# Patient Record
Sex: MALE | Race: WHITE
[De-identification: names, ages, dates, MRNs, and addresses within clinical notes are randomized per-mention and may not be internally consistent; named-entity substitution may affect disease eponyms.]

---

## 2018-01-19 ENCOUNTER — HOSPITAL ENCOUNTER (OUTPATIENT)
Dept: HOSPITAL 95 - ORSCSDS | Age: 63
Discharge: HOME | End: 2018-01-19
Payer: COMMERCIAL

## 2018-01-19 VITALS — BODY MASS INDEX: 34.27 KG/M2 | WEIGHT: 231.38 LBS | HEIGHT: 69.02 IN

## 2018-01-19 DIAGNOSIS — Z79.01: ICD-10-CM

## 2018-01-19 DIAGNOSIS — K22.10: Primary | ICD-10-CM

## 2018-01-19 DIAGNOSIS — E78.5: ICD-10-CM

## 2018-01-19 DIAGNOSIS — K44.9: ICD-10-CM

## 2018-01-19 DIAGNOSIS — I10: ICD-10-CM

## 2018-01-19 DIAGNOSIS — J44.9: ICD-10-CM

## 2018-01-19 DIAGNOSIS — Z79.84: ICD-10-CM

## 2018-01-19 DIAGNOSIS — Z87.891: ICD-10-CM

## 2018-01-19 DIAGNOSIS — Z99.81: ICD-10-CM

## 2018-01-19 DIAGNOSIS — E11.9: ICD-10-CM

## 2018-01-19 DIAGNOSIS — Z79.899: ICD-10-CM

## 2018-01-19 PROCEDURE — 0DJ08ZZ INSPECTION OF UPPER INTESTINAL TRACT, VIA NATURAL OR ARTIFICIAL OPENING ENDOSCOPIC: ICD-10-PCS | Performed by: INTERNAL MEDICINE

## 2018-03-23 ENCOUNTER — HOSPITAL ENCOUNTER (OUTPATIENT)
Dept: HOSPITAL 95 - ORSCSDS | Age: 63
Discharge: HOME | End: 2018-03-23
Payer: COMMERCIAL

## 2018-03-23 VITALS — BODY MASS INDEX: 34.21 KG/M2 | HEIGHT: 69.02 IN | WEIGHT: 231 LBS

## 2018-03-23 DIAGNOSIS — K31.7: ICD-10-CM

## 2018-03-23 DIAGNOSIS — Z79.4: ICD-10-CM

## 2018-03-23 DIAGNOSIS — Z79.899: ICD-10-CM

## 2018-03-23 DIAGNOSIS — E66.9: ICD-10-CM

## 2018-03-23 DIAGNOSIS — Z99.81: ICD-10-CM

## 2018-03-23 DIAGNOSIS — J44.9: ICD-10-CM

## 2018-03-23 DIAGNOSIS — E78.5: ICD-10-CM

## 2018-03-23 DIAGNOSIS — Z79.01: ICD-10-CM

## 2018-03-23 DIAGNOSIS — K22.10: Primary | ICD-10-CM

## 2018-03-23 DIAGNOSIS — E11.9: ICD-10-CM

## 2018-03-23 DIAGNOSIS — Z87.891: ICD-10-CM

## 2018-03-23 DIAGNOSIS — N18.3: ICD-10-CM

## 2018-03-23 DIAGNOSIS — I26.99: ICD-10-CM

## 2018-03-23 PROCEDURE — 0DB68ZX EXCISION OF STOMACH, VIA NATURAL OR ARTIFICIAL OPENING ENDOSCOPIC, DIAGNOSTIC: ICD-10-PCS | Performed by: INTERNAL MEDICINE

## 2022-12-04 ENCOUNTER — HOSPITAL ENCOUNTER (INPATIENT)
Dept: HOSPITAL 95 - ER | Age: 67
LOS: 2 days | Discharge: HOME | DRG: 193 | End: 2022-12-06
Attending: HOSPITALIST | Admitting: HOSPITALIST
Payer: MEDICARE

## 2022-12-04 VITALS — HEIGHT: 69 IN | WEIGHT: 257.5 LBS | BODY MASS INDEX: 38.14 KG/M2

## 2022-12-04 DIAGNOSIS — J96.01: ICD-10-CM

## 2022-12-04 DIAGNOSIS — Z85.820: ICD-10-CM

## 2022-12-04 DIAGNOSIS — K21.9: ICD-10-CM

## 2022-12-04 DIAGNOSIS — E78.5: ICD-10-CM

## 2022-12-04 DIAGNOSIS — J10.1: Primary | ICD-10-CM

## 2022-12-04 DIAGNOSIS — Z79.2: ICD-10-CM

## 2022-12-04 DIAGNOSIS — Z98.890: ICD-10-CM

## 2022-12-04 DIAGNOSIS — Z79.51: ICD-10-CM

## 2022-12-04 DIAGNOSIS — J44.1: ICD-10-CM

## 2022-12-04 DIAGNOSIS — N18.4: ICD-10-CM

## 2022-12-04 DIAGNOSIS — E11.42: ICD-10-CM

## 2022-12-04 DIAGNOSIS — Z79.01: ICD-10-CM

## 2022-12-04 DIAGNOSIS — E87.6: ICD-10-CM

## 2022-12-04 DIAGNOSIS — E11.22: ICD-10-CM

## 2022-12-04 DIAGNOSIS — M79.2: ICD-10-CM

## 2022-12-04 DIAGNOSIS — Z87.891: ICD-10-CM

## 2022-12-04 DIAGNOSIS — Z20.822: ICD-10-CM

## 2022-12-04 DIAGNOSIS — M62.82: ICD-10-CM

## 2022-12-04 DIAGNOSIS — I21.A1: ICD-10-CM

## 2022-12-04 DIAGNOSIS — Z79.4: ICD-10-CM

## 2022-12-04 DIAGNOSIS — Z91.041: ICD-10-CM

## 2022-12-04 DIAGNOSIS — I12.9: ICD-10-CM

## 2022-12-04 DIAGNOSIS — Z86.711: ICD-10-CM

## 2022-12-04 DIAGNOSIS — Z90.49: ICD-10-CM

## 2022-12-04 DIAGNOSIS — Z79.899: ICD-10-CM

## 2022-12-04 DIAGNOSIS — J45.901: ICD-10-CM

## 2022-12-04 DIAGNOSIS — E66.01: ICD-10-CM

## 2022-12-04 DIAGNOSIS — Z79.811: ICD-10-CM

## 2022-12-04 LAB
ALBUMIN SERPL BCP-MCNC: 3.3 G/DL (ref 3.4–5)
ALBUMIN SERPL BCP-MCNC: 3.7 G/DL (ref 3.4–5)
ALBUMIN/GLOB SERPL: 0.9 {RATIO} (ref 0.8–1.8)
ALBUMIN/GLOB SERPL: 1 {RATIO} (ref 0.8–1.8)
ALT SERPL W P-5'-P-CCNC: 19 U/L (ref 12–78)
ALT SERPL W P-5'-P-CCNC: 21 U/L (ref 12–78)
ANION GAP SERPL CALCULATED.4IONS-SCNC: 12 MMOL/L (ref 6–16)
ANION GAP SERPL CALCULATED.4IONS-SCNC: 9 MMOL/L (ref 6–16)
AST SERPL W P-5'-P-CCNC: 25 U/L (ref 12–37)
AST SERPL W P-5'-P-CCNC: 29 U/L (ref 12–37)
BASOPHILS # BLD AUTO: 0.04 K/MM3 (ref 0–0.23)
BASOPHILS # BLD AUTO: 0.06 K/MM3 (ref 0–0.23)
BASOPHILS NFR BLD AUTO: 1 % (ref 0–2)
BASOPHILS NFR BLD AUTO: 1 % (ref 0–2)
BILIRUB SERPL-MCNC: 0.3 MG/DL (ref 0.1–1)
BILIRUB SERPL-MCNC: 0.5 MG/DL (ref 0.1–1)
BUN SERPL-MCNC: 25 MG/DL (ref 8–24)
BUN SERPL-MCNC: 27 MG/DL (ref 8–24)
CALCIUM SERPL-MCNC: 8.5 MG/DL (ref 8.5–10.1)
CALCIUM SERPL-MCNC: 8.9 MG/DL (ref 8.5–10.1)
CHLORIDE SERPL-SCNC: 104 MMOL/L (ref 98–108)
CHLORIDE SERPL-SCNC: 105 MMOL/L (ref 98–108)
CHOLEST SERPL-MCNC: 169 MG/DL (ref 50–200)
CHOLEST/HDLC SERPL: 3.3 {RATIO}
CK MB CFR SERPL CALC: 0.4 % (ref 0–4)
CK MB CFR SERPL CALC: 0.9 % (ref 0–4)
CK SERPL-CCNC: 2350 U/L (ref 39–308)
CK SERPL-CCNC: 339 U/L (ref 39–308)
CO2 SERPL-SCNC: 20 MMOL/L (ref 21–32)
CO2 SERPL-SCNC: 24 MMOL/L (ref 21–32)
CREAT SERPL-MCNC: 2.36 MG/DL (ref 0.6–1.2)
CREAT SERPL-MCNC: 2.62 MG/DL (ref 0.6–1.2)
DEPRECATED RDW RBC AUTO: 49.9 FL (ref 35.1–46.3)
DEPRECATED RDW RBC AUTO: 51 FL (ref 35.1–46.3)
EOSINOPHIL # BLD AUTO: 0 K/MM3 (ref 0–0.68)
EOSINOPHIL # BLD AUTO: 0.01 K/MM3 (ref 0–0.68)
EOSINOPHIL NFR BLD AUTO: 0 % (ref 0–6)
EOSINOPHIL NFR BLD AUTO: 0 % (ref 0–6)
ERYTHROCYTE [DISTWIDTH] IN BLOOD BY AUTOMATED COUNT: 15.2 % (ref 11.7–14.2)
ERYTHROCYTE [DISTWIDTH] IN BLOOD BY AUTOMATED COUNT: 15.2 % (ref 11.7–14.2)
FLUAV RNA SPEC QL NAA+PROBE: POSITIVE
FLUBV RNA SPEC QL NAA+PROBE: NEGATIVE
GLOBULIN SER CALC-MCNC: 3.5 G/DL (ref 2.2–4)
GLOBULIN SER CALC-MCNC: 3.7 G/DL (ref 2.2–4)
GLUCOSE SERPL-MCNC: 221 MG/DL (ref 70–99)
GLUCOSE SERPL-MCNC: 257 MG/DL (ref 70–99)
HCT VFR BLD AUTO: 36.5 % (ref 37–53)
HCT VFR BLD AUTO: 38.4 % (ref 37–53)
HDLC SERPL-MCNC: 51 MG/DL (ref 39–?)
HGB BLD-MCNC: 11.8 G/DL (ref 13.5–17.5)
HGB BLD-MCNC: 12.4 G/DL (ref 13.5–17.5)
IMM GRANULOCYTES # BLD AUTO: 0.03 K/MM3 (ref 0–0.1)
IMM GRANULOCYTES # BLD AUTO: 0.05 K/MM3 (ref 0–0.1)
IMM GRANULOCYTES NFR BLD AUTO: 0 % (ref 0–1)
IMM GRANULOCYTES NFR BLD AUTO: 1 % (ref 0–1)
LDLC SERPL CALC-MCNC: 84 MG/DL (ref 0–110)
LDLC/HDLC SERPL: 1.7 {RATIO}
LYMPHOCYTES # BLD AUTO: 0.37 K/MM3 (ref 0.84–5.2)
LYMPHOCYTES # BLD AUTO: 1.1 K/MM3 (ref 0.84–5.2)
LYMPHOCYTES NFR BLD AUTO: 11 % (ref 21–46)
LYMPHOCYTES NFR BLD AUTO: 5 % (ref 21–46)
MCHC RBC AUTO-ENTMCNC: 32.3 G/DL (ref 31.5–36.5)
MCHC RBC AUTO-ENTMCNC: 32.3 G/DL (ref 31.5–36.5)
MCV RBC AUTO: 90 FL (ref 80–100)
MCV RBC AUTO: 91 FL (ref 80–100)
MONOCYTES # BLD AUTO: 0.66 K/MM3 (ref 0.16–1.47)
MONOCYTES # BLD AUTO: 0.75 K/MM3 (ref 0.16–1.47)
MONOCYTES NFR BLD AUTO: 8 % (ref 4–13)
MONOCYTES NFR BLD AUTO: 8 % (ref 4–13)
NEUTROPHILS # BLD AUTO: 7.1 K/MM3 (ref 1.96–9.15)
NEUTROPHILS # BLD AUTO: 7.86 K/MM3 (ref 1.96–9.15)
NEUTROPHILS NFR BLD AUTO: 80 % (ref 41–73)
NEUTROPHILS NFR BLD AUTO: 87 % (ref 41–73)
NRBC # BLD AUTO: 0 K/MM3 (ref 0–0.02)
NRBC # BLD AUTO: 0 K/MM3 (ref 0–0.02)
NRBC BLD AUTO-RTO: 0 /100 WBC (ref 0–0.2)
NRBC BLD AUTO-RTO: 0 /100 WBC (ref 0–0.2)
PLATELET # BLD AUTO: 185 K/MM3 (ref 150–400)
PLATELET # BLD AUTO: 210 K/MM3 (ref 150–400)
POTASSIUM SERPL-SCNC: 3.3 MMOL/L (ref 3.5–5.5)
POTASSIUM SERPL-SCNC: 3.6 MMOL/L (ref 3.5–5.5)
PROT SERPL-MCNC: 6.8 G/DL (ref 6.4–8.2)
PROT SERPL-MCNC: 7.4 G/DL (ref 6.4–8.2)
RSV RNA SPEC QL NAA+PROBE: NEGATIVE
SARS-COV-2 RNA RESP QL NAA+PROBE: NEGATIVE
SODIUM SERPL-SCNC: 137 MMOL/L (ref 136–145)
SODIUM SERPL-SCNC: 137 MMOL/L (ref 136–145)
TRIGL SERPL-MCNC: 168 MG/DL (ref 30–160)
VLDLC SERPL CALC-MCNC: 33 MG/DL (ref 6–32)

## 2022-12-04 PROCEDURE — G0378 HOSPITAL OBSERVATION PER HR: HCPCS

## 2022-12-04 PROCEDURE — A9270 NON-COVERED ITEM OR SERVICE: HCPCS

## 2022-12-04 NOTE — NUR
SHIFT SUMMARY:
PATIENT CAME UP FROM ER TODAY AT 1525.
PATIENT IS A&OX4. VS ARE WNL AND IS ON RA WITH >90% OXYGEN SATS BUT
HE STATED "I WEAR 1.5L OF OXYGEN AT NIGHT ONLY". LUNGS ARE DIMINISHED IN THE
BASES AND HAS EXPIRATORY WHEEZING MORE SO WITH EXERTION. HE WAS ABLE TO BE A
SBA FROM THE GURNEY TO THE BED. HE IS VOIDING AND TOLERATING SMALL AMOUNTS OF
PO INTAKE. HE IS LAYING IN BED WITH CALL LIGHT WITHIN REACH.

## 2022-12-05 LAB
ALBUMIN SERPL BCP-MCNC: 3.3 G/DL (ref 3.4–5)
ANION GAP SERPL CALCULATED.4IONS-SCNC: 9 MMOL/L (ref 6–16)
BASOPHILS # BLD AUTO: 0.01 K/MM3 (ref 0–0.23)
BASOPHILS NFR BLD AUTO: 0 % (ref 0–2)
BUN SERPL-MCNC: 38 MG/DL (ref 8–24)
CALCIUM SERPL-MCNC: 8.7 MG/DL (ref 8.5–10.1)
CHLORIDE SERPL-SCNC: 108 MMOL/L (ref 98–108)
CO2 SERPL-SCNC: 23 MMOL/L (ref 21–32)
CREAT SERPL-MCNC: 2.35 MG/DL (ref 0.6–1.2)
DEPRECATED RDW RBC AUTO: 49.8 FL (ref 35.1–46.3)
EOSINOPHIL # BLD AUTO: 0 K/MM3 (ref 0–0.68)
EOSINOPHIL NFR BLD AUTO: 0 % (ref 0–6)
ERYTHROCYTE [DISTWIDTH] IN BLOOD BY AUTOMATED COUNT: 15.2 % (ref 11.7–14.2)
GLUCOSE SERPL-MCNC: 229 MG/DL (ref 70–99)
HCT VFR BLD AUTO: 37.5 % (ref 37–53)
HGB BLD-MCNC: 12.3 G/DL (ref 13.5–17.5)
IMM GRANULOCYTES # BLD AUTO: 0.06 K/MM3 (ref 0–0.1)
IMM GRANULOCYTES NFR BLD AUTO: 1 % (ref 0–1)
LYMPHOCYTES # BLD AUTO: 0.65 K/MM3 (ref 0.84–5.2)
LYMPHOCYTES NFR BLD AUTO: 5 % (ref 21–46)
MCHC RBC AUTO-ENTMCNC: 32.8 G/DL (ref 31.5–36.5)
MCV RBC AUTO: 90 FL (ref 80–100)
MONOCYTES # BLD AUTO: 0.37 K/MM3 (ref 0.16–1.47)
MONOCYTES NFR BLD AUTO: 3 % (ref 4–13)
NEUTROPHILS # BLD AUTO: 11.45 K/MM3 (ref 1.96–9.15)
NEUTROPHILS NFR BLD AUTO: 91 % (ref 41–73)
NRBC # BLD AUTO: 0 K/MM3 (ref 0–0.02)
NRBC BLD AUTO-RTO: 0 /100 WBC (ref 0–0.2)
PHOSPHATE SERPL-MCNC: 2.7 MG/DL (ref 2.5–4.9)
PLATELET # BLD AUTO: 215 K/MM3 (ref 150–400)
POTASSIUM SERPL-SCNC: 4.5 MMOL/L (ref 3.5–5.5)
SODIUM SERPL-SCNC: 140 MMOL/L (ref 136–145)

## 2022-12-05 NOTE — NUR
SUMMARY
NO ACUTE CHANGES T/O SHIFT.  PT REPORTS FEELING IMPROVED AND HOPING TO DC
TOMORROW.  TELE SR.  DR RAMIREZ IN TO SEE PT THIS SHIFT.  SPUTUM SAMPLE SENT
PER ORDERS.  PT VOIDED.  IV FLUIDS INFUSING PER ORDERS.  CALL LIGHT IN REACH.

## 2022-12-05 NOTE — NUR
SHIFT SUMMARY
AOX4. INDEPENDENT IN ROOM. REPORTS MILD CHEST PAIN AT THE BEGINING OF SHIFT.
IMPROVED THIS MORNING. PT WAS ALSO HYPERTENSIVE LAST NIGHT, IMPROVE BP THIS
AM. PT SLEPT GOOD OVERNIGHT. VOIDING. VSS. PT WEARS 1.5L AT NIGHT AT BASELINE.
NASAL CANNULA WITH 1.5L LAST NIGHT. 02 SATS WNL. CALL LIGHT WITHIN REACH. WILL
PROVIDE REPORT TO ONCOMING NURSE.

## 2022-12-06 LAB
ALBUMIN SERPL BCP-MCNC: 3.2 G/DL (ref 3.4–5)
ANION GAP SERPL CALCULATED.4IONS-SCNC: 8 MMOL/L (ref 6–16)
BASOPHILS # BLD AUTO: 0 K/MM3 (ref 0–0.23)
BASOPHILS NFR BLD AUTO: 0 % (ref 0–2)
BUN SERPL-MCNC: 46 MG/DL (ref 8–24)
CALCIUM SERPL-MCNC: 8.7 MG/DL (ref 8.5–10.1)
CHLORIDE SERPL-SCNC: 109 MMOL/L (ref 98–108)
CK SERPL-CCNC: 1668 U/L (ref 39–308)
CO2 SERPL-SCNC: 24 MMOL/L (ref 21–32)
CREAT SERPL-MCNC: 2.2 MG/DL (ref 0.6–1.2)
DEPRECATED RDW RBC AUTO: 50.3 FL (ref 35.1–46.3)
EOSINOPHIL # BLD AUTO: 0 K/MM3 (ref 0–0.68)
EOSINOPHIL NFR BLD AUTO: 0 % (ref 0–6)
ERYTHROCYTE [DISTWIDTH] IN BLOOD BY AUTOMATED COUNT: 15 % (ref 11.7–14.2)
GLUCOSE SERPL-MCNC: 253 MG/DL (ref 70–99)
HCT VFR BLD AUTO: 38.7 % (ref 37–53)
HGB BLD-MCNC: 12.3 G/DL (ref 13.5–17.5)
IMM GRANULOCYTES # BLD AUTO: 0.04 K/MM3 (ref 0–0.1)
IMM GRANULOCYTES NFR BLD AUTO: 0 % (ref 0–1)
LYMPHOCYTES # BLD AUTO: 0.7 K/MM3 (ref 0.84–5.2)
LYMPHOCYTES NFR BLD AUTO: 6 % (ref 21–46)
MCHC RBC AUTO-ENTMCNC: 31.8 G/DL (ref 31.5–36.5)
MCV RBC AUTO: 90 FL (ref 80–100)
MONOCYTES # BLD AUTO: 0.24 K/MM3 (ref 0.16–1.47)
MONOCYTES NFR BLD AUTO: 2 % (ref 4–13)
NEUTROPHILS # BLD AUTO: 10.37 K/MM3 (ref 1.96–9.15)
NEUTROPHILS NFR BLD AUTO: 91 % (ref 41–73)
NRBC # BLD AUTO: 0 K/MM3 (ref 0–0.02)
NRBC BLD AUTO-RTO: 0 /100 WBC (ref 0–0.2)
PHOSPHATE SERPL-MCNC: 2.6 MG/DL (ref 2.5–4.9)
PLATELET # BLD AUTO: 248 K/MM3 (ref 150–400)
POTASSIUM SERPL-SCNC: 4.4 MMOL/L (ref 3.5–5.5)
SODIUM SERPL-SCNC: 141 MMOL/L (ref 136–145)

## 2022-12-06 NOTE — NUR
PT VSS T/O NIGHT. PT DENIED CP/PRESSURE. PT DOES BECOME SOB W/EXERTION, SATS
>90% ON 1.5L O2 NC. LUNGS W/FAINT WHEEZING ON RIGHT SIDE, PT CALLED FOR PRN
INHALER X2 THIS SHIFT. DR RAMIREZ IN THIS AM FOR UPDATE. PT AWAKE AND
RESTLESS FOR MAJORITY OF NIGHT, IS EAGER TO D/C HOME.

## 2022-12-06 NOTE — NUR
ASSUMED CARE OF PT, DENIES ANY DISCOMFORT AT THIS TIME, WANTS TO GO HOME, HOME
O2 EVAL ORDER NOTED, RT PAGED, CONT. TO MONITOR FOR ANY CHANGES.

## 2023-05-10 ENCOUNTER — HOSPITAL ENCOUNTER (OUTPATIENT)
Dept: HOSPITAL 95 - LAB SHORT | Age: 68
Discharge: HOME | End: 2023-05-10
Attending: FAMILY MEDICINE
Payer: MEDICARE

## 2023-05-10 DIAGNOSIS — E87.6: ICD-10-CM

## 2023-05-10 DIAGNOSIS — I50.9: Primary | ICD-10-CM

## 2023-05-10 DIAGNOSIS — E11.9: ICD-10-CM

## 2023-05-10 LAB
ALBUMIN SERPL BCP-MCNC: 2.8 G/DL (ref 3.4–5)
ALBUMIN/GLOB SERPL: 0.7 {RATIO} (ref 0.8–1.8)
ALT SERPL W P-5'-P-CCNC: 13 U/L (ref 12–78)
ANION GAP SERPL CALCULATED.4IONS-SCNC: 6 MMOL/L (ref 6–16)
AST SERPL W P-5'-P-CCNC: 14 U/L (ref 12–37)
BILIRUB SERPL-MCNC: 0.4 MG/DL (ref 0.1–1)
BUN SERPL-MCNC: 20 MG/DL (ref 8–24)
CALCIUM SERPL-MCNC: 8.4 MG/DL (ref 8.5–10.1)
CHLORIDE SERPL-SCNC: 109 MMOL/L (ref 98–108)
CO2 SERPL-SCNC: 23 MMOL/L (ref 21–32)
CREAT SERPL-MCNC: 1.95 MG/DL (ref 0.6–1.2)
GLOBULIN SER CALC-MCNC: 3.9 G/DL (ref 2.2–4)
GLUCOSE SERPL-MCNC: 194 MG/DL (ref 70–99)
POTASSIUM SERPL-SCNC: 3.5 MMOL/L (ref 3.5–5.5)
PROT SERPL-MCNC: 6.7 G/DL (ref 6.4–8.2)
SODIUM SERPL-SCNC: 138 MMOL/L (ref 136–145)